# Patient Record
Sex: FEMALE | Race: ASIAN | NOT HISPANIC OR LATINO | Employment: UNEMPLOYED | URBAN - METROPOLITAN AREA
[De-identification: names, ages, dates, MRNs, and addresses within clinical notes are randomized per-mention and may not be internally consistent; named-entity substitution may affect disease eponyms.]

---

## 2018-01-12 NOTE — PROGRESS NOTES
Assessment    1  Cervical pain (723 1) (M54 2)   2  Cervical disc disorder (722 91) (M50 90)   3  Cervical myofascial pain syndrome (729 1) (M79 1)   4  Bilateral occipital neuralgia (723 8) (M54 81)    Plan  Bilateral occipital neuralgia, Cervical disc disorder, Cervical myofascial pain syndrome,  Cervical pain    · *1 - [facility] Physical Therapy  Consult  Status: Hold For - Scheduling   Requested for: [id number]   Ordered; For: Bilateral occipital neuralgia, Cervical disc disorder, Cervical myofascial pain syndrome, Cervical pain; Ordered By: Carrie Fernandez Performed:  Due: [id number]  Care Summary provided  : Yes   · Follow-up Visit in 4 Weeks Evaluation and Treatment  Follow-up  Status: Hold For -  Scheduling  Requested for: [id number]   Ordered; For: Bilateral occipital neuralgia, Cervical disc disorder, Cervical myofascial pain syndrome, Cervical pain; Ordered By: Carrie Fernandez Performed:  Due: [id number]    Discussion/Summary    My impressions and treatment recommendations were discussed in detail with the patient, who verbalized understanding and had no further questions  I discussed several interventional pain procedures for the patient, including cervical epidural steroid injections, trigger point injections, and bilateral occipital nerve blocks, however the patient wished to continue with physical therapy at this point  I've given the patient a new prescription for physical therapy 2-3 times per week for 4-6 weeks  I also suggested the patient trial a compounding cream to help relieve some of her myofascial pain  I've given her a prescription for this compounding cream to be used 2-4 times per day on her neck and upper back  I suggested that if she is not improved by her next office visit we should try more interventional pain procedures  Followup is planned with the patient in 4 weeks' time or sooner as warranted  Discharge instructions were provided   I personally saw and examined the patient and I agree with the above discussed plan of care  Chief Complaint    1  Neck Pain    History of Present Illness  Ms Del Angel is a pleasant 75-year-old female who presents to [facility] [address] [facility] for interval reevaluation of the above-stated pain complaints  The patient has a past medical and chronic pain history as outlined in the assessment section below  She was last seen in our office on December 15, 2015 at which time I suggested initiating physical therapy, trialed her on a H-wave unit, and prescribe her acupuncture  At today's office visit, the patient's pain score is 8/10 on the verbal numerical pain rating scale  She continues to complain of cervical pain that radiates into her head and up and over her head in what appears to be the bilateral occipital nerve distribution  She also complains of upper back pain that is nonradiating  She reports that her pain is worse in the morning and evening  She describes her pain as constant and reports that the quality of her pain as "sharp, pressure-like, numbness, vibration, and headache " The patient reports that she trialed the H-wave unit, but it gave her "shocks "and she returned it  She continues to go to physical therapy and reports that it is significantly improving her pain  Other than as stated above, the patient denies any interval changes in medications, medical condition, mental condition, symptoms, or allergies since the last office visit  WES DEL ANGEL presents with complaints of sudden onset of constant episodes of bilateral posterior neck pain, described as sharp, radiating to the bilateral trapezius  Review of Systems    Constitutional: no fever, no recent weight gain and no recent weight loss  Eyes: no double vision and no blurry vision  Cardiovascular: no chest pain, no palpitations and no lower extremity edema  Respiratory: no complaints of shortness of breath and no wheezing     Musculoskeletal: no difficulty walking, no muscle weakness, no joint stiffness, no joint swelling, no limb swelling`, no pain in extremity and no decreased range of motion  Neurological: no dizziness, no difficulty swallowing, no memory loss, no loss of consciousness and no seizures  Gastrointestinal: no nausea, no vomiting, no constipation and no diarrhea  Genitourinary: no difficulty initiating urine stream, no genital pain and no frequent urination  Integumentary: a rash  Psychiatric: no depression  Endocrine: no excessive thirst, no adrenal disease, no hypothyroidism and no hyperthyroidism  Hematologic/Lymphatic: no tendency for easy bruising and no tendency for easy bleeding  ROS reviewed  Active Problems    1  Cervical disc disorder (722 91) (M50 90)   2  Cervical myofascial pain syndrome (729 1) (M79 1)   3  Cervical pain (723 1) (M54 2)    Allergies    1  Tylenol    2  Latex    Vitals  Vital Signs [Data Includes: Current Encounter]    Recorded: 12Jan2016 02:09PM   Heart Rate 64   Respiration 18   Systolic 485   Diastolic 80   Height 4 ft 8 in   Weight 120 lb    BMI Calculated 26 9   BSA Calculated 1 43   Pain Scale 8     Physical Exam    Constitutional   General appearance: Abnormal   overweight  Eyes   Sclera: anicteric   HEENT   Hearing grossly intact  Neck   Neck: Supple, symmetric, trachea midline, no masses  Pulmonary   Respiratory effort: Even and unlabored  Cardiovascular   Examination of extremities: No edema or pitting edema present  Skin   Skin and subcutaneous tissue: Normal without rashes or lesions, well hydrated  Psychiatric   Mood and affect: Mood and affect appropriate  Neurologic   Cranial nerves: Cranial nerves II-XII grossly intact  Musculoskeletal   Gait and station: Normal   Gait is nonantalgic  Station is neutral    Cervical Spine examination demonstrates Cervical Spine:   Appearance: Normal normal lordosis     Tenderness: None except at the right paraspinal tenderness, left paraspinal tenderness, right trapezius muscle and left trapezius muscle  Palpatory findings include bilateral muscle spasms   Significant tenderness on several trigger points noted of the bilateral upper trapezius and cervical paraspinal musculature  Cervical Sensory Exam:  intact to light touch and pinprick in the upper extremities  ROM: Full    hand strength was normal bilaterally  wrist strength was normal bilaterally  elbow strength was normal bilaterally  shoulder strength was normal bilaterally  Results/Data  Results Free Text Form [facility]:   Results     CT Scan  Cervical 10/9/16  FINDINGS-    ALIGNMENT- Normal alignment of the cervical spine  No subluxation  VERTEBRAL BODIES- No fracture  DEGENERATIVE CHANGES- No significant cervical degenerative changes are  noted  Probable tiny central disc protrusions C3-4, C4-5, C5-6    PREVERTEBRAL AND PARASPINAL SOFT TISSUES- Normal     THORACIC INLET- Normal     IMPRESSION-  Probable tiny central disc protrusion C3-4, C4-5 and C5-6    No cervical spine fracture or traumatic malalignment                Future Appointments    Date/Time Provider Specialty Site   04/07/2016 03:00 PM Harold Meigs, MD Neurology [facility]     Signatures   Electronically signed by : ELIA Gardner ; Jan 12 2016  2:56PM EST                       (Author)

## 2020-11-14 ENCOUNTER — TRANSCRIBE ORDERS (OUTPATIENT)
Dept: ADMINISTRATIVE | Age: 50
End: 2020-11-14

## 2020-11-14 ENCOUNTER — LAB (OUTPATIENT)
Dept: LAB | Age: 50
End: 2020-11-14

## 2020-11-14 DIAGNOSIS — Z02.1 PHYSICAL EXAM, PRE-EMPLOYMENT: Primary | ICD-10-CM

## 2020-11-14 DIAGNOSIS — Z02.1 PHYSICAL EXAM, PRE-EMPLOYMENT: ICD-10-CM

## 2020-11-14 LAB — RUBV IGG SERPL IA-ACNC: 27 IU/ML

## 2020-11-14 PROCEDURE — 86480 TB TEST CELL IMMUN MEASURE: CPT

## 2020-11-14 PROCEDURE — 86765 RUBEOLA ANTIBODY: CPT

## 2020-11-14 PROCEDURE — 86787 VARICELLA-ZOSTER ANTIBODY: CPT

## 2020-11-14 PROCEDURE — 86762 RUBELLA ANTIBODY: CPT

## 2020-11-16 LAB
GAMMA INTERFERON BACKGROUND BLD IA-ACNC: 0.35 IU/ML
M TB IFN-G BLD-IMP: POSITIVE
M TB IFN-G CD4+ BCKGRND COR BLD-ACNC: 1.49 IU/ML
M TB IFN-G CD4+ BCKGRND COR BLD-ACNC: 2.13 IU/ML
MITOGEN IGNF BCKGRD COR BLD-ACNC: >10 IU/ML

## 2020-11-17 LAB
MEV IGG SER QL: NORMAL
VZV IGG SER IA-ACNC: NORMAL

## 2020-12-15 PROBLEM — L29.0 PRURITUS ANI: Status: ACTIVE | Noted: 2020-12-15

## 2020-12-21 ENCOUNTER — TRANSCRIBE ORDERS (OUTPATIENT)
Dept: ADMINISTRATIVE | Facility: HOSPITAL | Age: 50
End: 2020-12-21

## 2020-12-21 ENCOUNTER — LAB (OUTPATIENT)
Dept: LAB | Facility: HOSPITAL | Age: 50
End: 2020-12-21

## 2020-12-21 DIAGNOSIS — Z11.1 SCREENING EXAMINATION FOR PULMONARY TUBERCULOSIS: Primary | ICD-10-CM

## 2020-12-21 DIAGNOSIS — Z11.1 SCREENING EXAMINATION FOR PULMONARY TUBERCULOSIS: ICD-10-CM

## 2020-12-21 PROCEDURE — 36415 COLL VENOUS BLD VENIPUNCTURE: CPT

## 2020-12-21 PROCEDURE — 86480 TB TEST CELL IMMUN MEASURE: CPT

## 2020-12-23 LAB
GAMMA INTERFERON BACKGROUND BLD IA-ACNC: 0.13 IU/ML
M TB IFN-G BLD-IMP: POSITIVE
M TB IFN-G CD4+ BCKGRND COR BLD-ACNC: 1.14 IU/ML
M TB IFN-G CD4+ BCKGRND COR BLD-ACNC: 1.38 IU/ML
MITOGEN IGNF BCKGRD COR BLD-ACNC: >10 IU/ML

## 2020-12-30 ENCOUNTER — TRANSCRIBE ORDERS (OUTPATIENT)
Dept: ADMINISTRATIVE | Facility: HOSPITAL | Age: 50
End: 2020-12-30

## 2020-12-30 ENCOUNTER — HOSPITAL ENCOUNTER (OUTPATIENT)
Dept: RADIOLOGY | Facility: HOSPITAL | Age: 50
Discharge: HOME | End: 2020-12-30

## 2020-12-30 DIAGNOSIS — R76.8 FALSE POSITIVE ANA: Primary | ICD-10-CM

## 2020-12-30 DIAGNOSIS — R76.8 FALSE POSITIVE ANA: ICD-10-CM

## 2020-12-30 PROCEDURE — 71045 X-RAY EXAM CHEST 1 VIEW: CPT

## 2021-01-18 PROBLEM — K64.2 GRADE III HEMORRHOIDS: Status: ACTIVE | Noted: 2021-01-18

## 2021-01-18 PROBLEM — Z12.11 SCREENING FOR MALIGNANT NEOPLASM OF COLON: Status: ACTIVE | Noted: 2021-01-18

## 2021-03-04 ENCOUNTER — ANESTHESIA EVENT (OUTPATIENT)
Dept: PERIOP | Facility: HOSPITAL | Age: 51
End: 2021-03-04
Payer: COMMERCIAL

## 2021-03-04 NOTE — ANESTHESIA PREPROCEDURE EVALUATION
Procedure:  HEMORRHOIDALPEXY [facility]) (N/A Anus)    Relevant Problems   No relevant active problems        Physical Exam    Airway    Mallampati score: I  TM Distance: >3 FB  Neck ROM: full     Dental   No notable dental hx     Cardiovascular      Pulmonary      Other Findings        Anesthesia Plan  ASA Score- 1     Anesthesia Type- general with ASA Monitors  Additional Monitors:   Airway Plan: ETT  Plan Factors-Exercise tolerance (METS): >4 METS  Chart reviewed  Patient is not a current smoker  Patient not instructed to abstain from smoking on day of procedure  Patient did not smoke on day of surgery  Induction- intravenous  Postoperative Plan-     Informed Consent- Anesthetic plan and risks discussed with patient and spouse  I personally reviewed this patient with the CRNA  Discussed and agreed on the Anesthesia Plan with the CRNA  Laverne Soto

## 2021-03-04 NOTE — PRE-PROCEDURE INSTRUCTIONS
Pre-Surgery Instructions:   Medication Instructions    cetirizine (ZyrTEC) 5 MG tablet Instructed patient per Anesthesia Guidelines  Patient may take Zyrtec DOS  Hold all NSAIDs, aspirins, vitamins and supplements prior to surgery

## 2021-03-05 ENCOUNTER — ANESTHESIA (OUTPATIENT)
Dept: PERIOP | Facility: HOSPITAL | Age: 51
End: 2021-03-05
Payer: COMMERCIAL

## 2021-03-05 ENCOUNTER — HOSPITAL ENCOUNTER (OUTPATIENT)
Facility: HOSPITAL | Age: 51
Setting detail: OUTPATIENT SURGERY
Discharge: HOME | End: 2021-03-05
Attending: COLON & RECTAL SURGERY | Admitting: COLON & RECTAL SURGERY
Payer: COMMERCIAL

## 2021-03-05 VITALS
SYSTOLIC BLOOD PRESSURE: 112 MMHG | HEART RATE: 80 BPM | RESPIRATION RATE: 20 BRPM | TEMPERATURE: 97 F | DIASTOLIC BLOOD PRESSURE: 68 MMHG | OXYGEN SATURATION: 97 % | HEIGHT: 56 IN | WEIGHT: 121 LBS | BODY MASS INDEX: 27.22 KG/M2

## 2021-03-05 DIAGNOSIS — K64.2 GRADE III HEMORRHOIDS: ICD-10-CM

## 2021-03-05 LAB
EXT PREGNANCY TEST URINE: NEGATIVE
EXT. CONTROL: NORMAL

## 2021-03-05 PROCEDURE — 88304 TISSUE EXAM BY PATHOLOGIST: CPT | Performed by: PATHOLOGY

## 2021-03-05 PROCEDURE — C9290 INJ, BUPIVACAINE LIPOSOME: HCPCS | Performed by: COLON & RECTAL SURGERY

## 2021-03-05 PROCEDURE — 81025 URINE PREGNANCY TEST: CPT | Performed by: COLON & RECTAL SURGERY

## 2021-03-05 PROCEDURE — 46260 REMOVE IN/EX HEM GROUPS 2+: CPT | Performed by: COLON & RECTAL SURGERY

## 2021-03-05 RX ORDER — DEXAMETHASONE SODIUM PHOSPHATE 10 MG/ML
INJECTION, SOLUTION INTRAMUSCULAR; INTRAVENOUS AS NEEDED
Status: DISCONTINUED | OUTPATIENT
Start: 2021-03-05 | End: 2021-03-05

## 2021-03-05 RX ORDER — PROPOFOL 10 MG/ML
INJECTION, EMULSION INTRAVENOUS AS NEEDED
Status: DISCONTINUED | OUTPATIENT
Start: 2021-03-05 | End: 2021-03-05

## 2021-03-05 RX ORDER — ONDANSETRON 2 MG/ML
INJECTION INTRAMUSCULAR; INTRAVENOUS AS NEEDED
Status: DISCONTINUED | OUTPATIENT
Start: 2021-03-05 | End: 2021-03-05

## 2021-03-05 RX ORDER — OXYCODONE HYDROCHLORIDE AND ACETAMINOPHEN 5; 325 MG/1; MG/1
1 TABLET ORAL EVERY 6 HOURS PRN
Qty: 20 TABLET | Refills: 0 | Status: SHIPPED | OUTPATIENT
Start: 2021-03-05 | End: 2021-03-10

## 2021-03-05 RX ORDER — LIDOCAINE HYDROCHLORIDE 10 MG/ML
INJECTION, SOLUTION EPIDURAL; INFILTRATION; INTRACAUDAL; PERINEURAL AS NEEDED
Status: DISCONTINUED | OUTPATIENT
Start: 2021-03-05 | End: 2021-03-05

## 2021-03-05 RX ORDER — DIPHENHYDRAMINE HYDROCHLORIDE 50 MG/ML
12.5 INJECTION INTRAMUSCULAR; INTRAVENOUS ONCE AS NEEDED
Status: DISCONTINUED | OUTPATIENT
Start: 2021-03-05 | End: 2021-03-05 | Stop reason: HOSPADM

## 2021-03-05 RX ORDER — SODIUM CHLORIDE, SODIUM LACTATE, POTASSIUM CHLORIDE, CALCIUM CHLORIDE 600; 310; 30; 20 MG/100ML; MG/100ML; MG/100ML; MG/100ML
50 INJECTION, SOLUTION INTRAVENOUS CONTINUOUS
Status: DISCONTINUED | OUTPATIENT
Start: 2021-03-05 | End: 2021-03-05 | Stop reason: HOSPADM

## 2021-03-05 RX ORDER — MAGNESIUM HYDROXIDE 1200 MG/15ML
LIQUID ORAL AS NEEDED
Status: DISCONTINUED | OUTPATIENT
Start: 2021-03-05 | End: 2021-03-05 | Stop reason: HOSPADM

## 2021-03-05 RX ORDER — SODIUM CHLORIDE, SODIUM LACTATE, POTASSIUM CHLORIDE, CALCIUM CHLORIDE 600; 310; 30; 20 MG/100ML; MG/100ML; MG/100ML; MG/100ML
125 INJECTION, SOLUTION INTRAVENOUS CONTINUOUS
Status: DISCONTINUED | OUTPATIENT
Start: 2021-03-05 | End: 2021-03-05 | Stop reason: HOSPADM

## 2021-03-05 RX ORDER — MIDAZOLAM HYDROCHLORIDE 2 MG/2ML
INJECTION, SOLUTION INTRAMUSCULAR; INTRAVENOUS AS NEEDED
Status: DISCONTINUED | OUTPATIENT
Start: 2021-03-05 | End: 2021-03-05

## 2021-03-05 RX ORDER — PROPOFOL 10 MG/ML
INJECTION, EMULSION INTRAVENOUS CONTINUOUS PRN
Status: DISCONTINUED | OUTPATIENT
Start: 2021-03-05 | End: 2021-03-05

## 2021-03-05 RX ORDER — BUPIVACAINE HYDROCHLORIDE AND EPINEPHRINE 2.5; 5 MG/ML; UG/ML
INJECTION, SOLUTION EPIDURAL; INFILTRATION; INTRACAUDAL; PERINEURAL AS NEEDED
Status: DISCONTINUED | OUTPATIENT
Start: 2021-03-05 | End: 2021-03-05 | Stop reason: HOSPADM

## 2021-03-05 RX ORDER — FENTANYL CITRATE 50 UG/ML
INJECTION, SOLUTION INTRAMUSCULAR; INTRAVENOUS AS NEEDED
Status: DISCONTINUED | OUTPATIENT
Start: 2021-03-05 | End: 2021-03-05

## 2021-03-05 RX ORDER — OXYCODONE HYDROCHLORIDE AND ACETAMINOPHEN 5; 325 MG/1; MG/1
1 TABLET ORAL EVERY 4 HOURS PRN
Status: DISCONTINUED | OUTPATIENT
Start: 2021-03-05 | End: 2021-03-05 | Stop reason: HOSPADM

## 2021-03-05 RX ORDER — SODIUM CHLORIDE, SODIUM LACTATE, POTASSIUM CHLORIDE, CALCIUM CHLORIDE 600; 310; 30; 20 MG/100ML; MG/100ML; MG/100ML; MG/100ML
INJECTION, SOLUTION INTRAVENOUS CONTINUOUS PRN
Status: DISCONTINUED | OUTPATIENT
Start: 2021-03-05 | End: 2021-03-05

## 2021-03-05 RX ORDER — FENTANYL CITRATE/PF 50 MCG/ML
25 SYRINGE (ML) INJECTION
Status: DISCONTINUED | OUTPATIENT
Start: 2021-03-05 | End: 2021-03-05 | Stop reason: HOSPADM

## 2021-03-05 RX ORDER — ONDANSETRON 2 MG/ML
4 INJECTION INTRAMUSCULAR; INTRAVENOUS ONCE AS NEEDED
Status: DISCONTINUED | OUTPATIENT
Start: 2021-03-05 | End: 2021-03-05 | Stop reason: HOSPADM

## 2021-03-05 RX ORDER — HYDROMORPHONE HCL/PF 1 MG/ML
0.2 SYRINGE (ML) INJECTION
Status: DISCONTINUED | OUTPATIENT
Start: 2021-03-05 | End: 2021-03-05 | Stop reason: HOSPADM

## 2021-03-05 RX ORDER — LIDOCAINE HYDROCHLORIDE 10 MG/ML
0.5 INJECTION, SOLUTION EPIDURAL; INFILTRATION; INTRACAUDAL; PERINEURAL ONCE AS NEEDED
Status: DISCONTINUED | OUTPATIENT
Start: 2021-03-05 | End: 2021-03-05 | Stop reason: HOSPADM

## 2021-03-05 RX ADMIN — PROPOFOL 50 MG: 10 INJECTION, EMULSION INTRAVENOUS at 15:10

## 2021-03-05 RX ADMIN — PROPOFOL 20 MG: 10 INJECTION, EMULSION INTRAVENOUS at 14:35

## 2021-03-05 RX ADMIN — FENTANYL CITRATE 25 MCG: 50 INJECTION, SOLUTION INTRAMUSCULAR; INTRAVENOUS at 15:23

## 2021-03-05 RX ADMIN — MIDAZOLAM HYDROCHLORIDE 1 MG: 1 INJECTION, SOLUTION INTRAMUSCULAR; INTRAVENOUS at 14:35

## 2021-03-05 RX ADMIN — SODIUM CHLORIDE, SODIUM LACTATE, POTASSIUM CHLORIDE, AND CALCIUM CHLORIDE: .6; .31; .03; .02 INJECTION, SOLUTION INTRAVENOUS at 14:23

## 2021-03-05 RX ADMIN — PROPOFOL 50 MCG/KG/MIN: 10 INJECTION, EMULSION INTRAVENOUS at 14:29

## 2021-03-05 RX ADMIN — FENTANYL CITRATE 25 MCG: 50 INJECTION, SOLUTION INTRAMUSCULAR; INTRAVENOUS at 14:38

## 2021-03-05 RX ADMIN — PHENYLEPHRINE HYDROCHLORIDE 100 MCG: 10 INJECTION INTRAVENOUS at 14:48

## 2021-03-05 RX ADMIN — PROPOFOL 20 MG: 10 INJECTION, EMULSION INTRAVENOUS at 14:37

## 2021-03-05 RX ADMIN — DEXAMETHASONE SODIUM PHOSPHATE 4 MG: 10 INJECTION, SOLUTION INTRAMUSCULAR; INTRAVENOUS at 14:33

## 2021-03-05 RX ADMIN — LIDOCAINE HYDROCHLORIDE 50 MG: 10 INJECTION, SOLUTION EPIDURAL; INFILTRATION; INTRACAUDAL at 14:28

## 2021-03-05 RX ADMIN — SODIUM CHLORIDE, SODIUM LACTATE, POTASSIUM CHLORIDE, AND CALCIUM CHLORIDE: .6; .31; .03; .02 INJECTION, SOLUTION INTRAVENOUS at 15:11

## 2021-03-05 RX ADMIN — PROPOFOL 40 MG: 10 INJECTION, EMULSION INTRAVENOUS at 14:29

## 2021-03-05 RX ADMIN — FENTANYL CITRATE 25 MCG: 50 INJECTION, SOLUTION INTRAMUSCULAR; INTRAVENOUS at 14:48

## 2021-03-05 RX ADMIN — FENTANYL CITRATE 25 MCG: 50 INJECTION, SOLUTION INTRAMUSCULAR; INTRAVENOUS at 14:35

## 2021-03-05 RX ADMIN — MIDAZOLAM HYDROCHLORIDE 1 MG: 1 INJECTION, SOLUTION INTRAMUSCULAR; INTRAVENOUS at 14:23

## 2021-03-05 RX ADMIN — ONDANSETRON 4 MG: 2 INJECTION INTRAMUSCULAR; INTRAVENOUS at 14:32

## 2021-03-05 NOTE — OP NOTE
OPERATIVE REPORT  PATIENT NAME: Ethlyn Cranker    :  1970  MRN: [id number]  Pt Location: AN OR ROOM 01    SURGERY DATE: 3/5/2021    Surgeon(s) and Role:     * Gian Hernandez MD - Primary     * Che Luna MD - Assisting    Preop Diagnosis:  Grade III hemorrhoids [K64 2]    Post-Op Diagnosis Codes:  Internal and external hemorrhoids    Procedure(s) (LRB):  HEMORRHOIDECTOMY (N/A) , internal and external, 3 columns  Specimen(s):  ID Type Source Tests Collected by Time Destination   1 :  Tissue Hemorrhoids TISSUE EXAM Gian Hernandez MD 3/5/2021 1437        Estimated Blood Loss:   Minimal    Drains:  * No LDAs found *    Anesthesia Type:   IV Sedation with Anesthesia    Operative Indications:  Grade III hemorrhoids [K64 2]    Operative Findings:  Hemorrhoids, internal and external     Complications:   None    Procedure and Technique:  The patient was placed in a prone sg-knife position with the buttocks taped apart  The anal area was prepared with Betadine and draped in a sterile manner  A time-out was done  Inspection revealed large external hemorrhoids with prolapse  We had considered the transanal hemorrhoidal pexy and de arterialization but this did not appear to be appropriate, as the external hemorrhoids were extremely redundant and prolapsed substantially with Valsalva as the patient coughed during the procedure  The external hemorrhoids appeared to be best treated by excision  Hemorrhoidectomy was therefore prepared for the left lateral, right anterior and right posterior positions  Each site was treated similarly as follows  The hemorrhoid was retracted with the [facility] retractor  The hemorrhoid was grasped at the anal verge using a clamp  It was withdrawn caudally  Scissors technique was used to excise the hemorrhoid from the skin, 1 5 cm distal to the anal verge, with sharp excision proximally to the proximal extent of the internal hemorrhoids    The hemorrhoids were amputated at the junction with the distal rectum  The wounds were closed using running 2 0 chromic suture  Wounds were inspected for hemostasis were found to be dry  Before and after the hemorrhoidectomies, the areas were anesthetized with Exparel diluted 1 part to 2 using 40% Marcaine with epinephrine  Sponge needle instrument counts were correct      I was present for the entire procedure    Patient Disposition:  PACU     SIGNATURE: Tomeka Vásquez MD  DATE: March 5, 2021  TIME: 3:19 PM

## 2021-03-05 NOTE — ANESTHESIA POSTPROCEDURE EVALUATION
Post-Op Assessment Note    CV Status:  Stable  Pain Score: 0    Pain management: adequate     Mental Status:  Alert and awake   Hydration Status:  Euvolemic   PONV Controlled:  Controlled   Airway Patency:  Patent      Post Op Vitals Reviewed: Yes      Staff: CRNA         No complications documented      BP 96/52   Temp   97 0   Pulse  94   Resp   16   SpO2   97%

## 2021-03-05 NOTE — H&P
History and Physical   Colon and Rectal Surgery   Clint Smith 46 y o  female MRN: [id number]  Unit/Bed#: OR POOL Encounter: [id number]  21   2:06 PM      CC:  Hemorrhoids  History of Present Illness   HPI:  Clint Smith is a 46 y o  female for surgery  Plan:   Grade III hemorrhoids  She has a history of bulging hemorrhoidal tissue that can be reduced  She was seen recently and pruritus a night was noted with excoriation  She was treated expectantly  Since that time, her symptoms have recurred intermittently  She has irritation, discomfort, difficulty with hygiene, and annoyance that is not overcome with reducing the hemorrhoids manually      Examination shows large external hemorrhoids, circumferentially  Pruritus and excoriation seemed to have resolved but the hemorrhoids did not remain reduced during the examination  I think that hemorrhoidectomy or Transanal hemorrhoidopexy and dearteriolization are the only things that will reduce the tissues to allow for her to become comfortable over time  Medical management was reviewed but she prefers surgery  Hemorrhoidectomy is recommended unless Transanal hemorrhoidopexy and dearteriolization seems ideally suited to her anatomy  She and her  understand this      Risks, not limited to infection, bleeding, pain, persistent disease, need for future surgery, fecal incontinence, or nonhealing wounds were reviewed at length  Questions were answered       Screening for malignant neoplasm of colon  Colonoscopy risks, not limited to bleeding, perforated colon, need for surgery, and missed lesions were discussed  Alternatives were discussed  Questions were answered  She agreed to the procedure   She understands the risk that colon cancer will be missed without screening        Historical Information   Past Medical History:   Diagnosis Date    Acute inflammation of the pancreas      Past Surgical History:   Procedure Laterality Date     SECTION Meds/Allergies     Medications Prior to Admission   Medication    cetirizine (ZyrTEC) 5 MG tablet    menthol-zinc oxide (CALMOSPETINE) 0 44-20 625 %         Current Facility-Administered Medications:     lactated ringers infusion, 125 mL/hr, Intravenous, Continuous, Mariel Wheeler MD    lidocaine (PF) (XYLOCAINE-MPF) 1 % injection 0 5 mL, 0 5 mL, Infiltration, Once PRN, Mariel Wheeler MD    Allergies   Allergen Reactions    Chicken Allergy     Chocolate     Eggs Or Egg-Derived Products     Orange Concentrate [Flavoring Agent]     Sugar-Protein-Starch          Social History   Social History     Substance and Sexual Activity   Alcohol Use Never    Frequency: Never     Social History     Substance and Sexual Activity   Drug Use Never     Social History     Tobacco Use   Smoking Status Never Smoker   Smokeless Tobacco Never Used         Family History:   Family History   Problem Relation Age of Onset    Colon cancer Neg Hx          Objective     Current Vitals:   Blood Pressure: 120/58 (03/05/21 1237)  Pulse: 84 (03/05/21 1237)  Temperature: 98 3 °F (36 8 °C) (03/05/21 1237)  Temp Source: Temporal (03/05/21 1237)  Respirations: 18 (03/05/21 1237)  Height: 4' 8" (142 2 cm) (03/05/21 1237)  Weight - Scale: 54 9 kg (121 lb) (03/05/21 1237)  SpO2: 99 % (03/05/21 1237)  No intake or output data in the 24 hours ending 03/05/21 1406    Physical Exam:  General:  Well nourished, no distress  Neuro: Alert and oriented  Eyes:Sclera anicteric, conjunctiva pink  Pulm: Clear to auscultation bilaterally  No respiratory Distress  CV:  Regular rate and rhythm  No murmurs  Abdomen:  Soft, flat, non-tender, without masses or hepatosplenomegaly  Lab Results:       ASSESSMENT:  Kerry Pool is a 46 y o  female for Transanal hemorrhoidopexy and dearteriolization vs hemorrhoidectomy   We will determine the best option in the OR  PLAN:  Risks, not limited to infection, bleeding, pain, persistent disease, need for future surgery, fecal incontinence, or nonhealing wounds were reviewed at length  Questions were answered     Erica Calix MD

## 2021-03-05 NOTE — DISCHARGE INSTRUCTIONS
Hemorrhoidectomy   WHAT YOU SHOULD KNOW:   A hemorrhoidectomy is surgery to remove hemorrhoids  Hemorrhoids are swollen blood vessels inside your rectum or on your anus  AFTER YOU LEAVE:   Medicines:   · Topical medicine: This may come as pads, creams, ointments, or lotions  This medicine may help decrease pain and swelling  They may help your rectum heal faster  · Pain medicine: You may be given a prescription medicine to decrease pain  Do not wait until the pain is severe before you take this medicine  · Stool softeners: This medicine makes it easier for you to have a bowel movement  You may need this medicine to treat or prevent constipation  · Antibiotics: This medicine is given to help treat or prevent an infection caused by bacteria  · Take your medicine as directed  Call your healthcare provider if you think your medicine is not helping or if you have side effects  Tell him if you are allergic to any medicine  Keep a list of the medicines, vitamins, and herbs you take  Include the amounts, and when and why you take them  Bring the list or the pill bottles to follow-up visits  Carry your medicine list with you in case of an emergency  Follow up with your healthcare provider as directed:  Write down your questions so you remember to ask them during your visits  Self-care:   · Warm sitz bath:  Heat may help to decrease pain  Use a sitz bath  A sitz bath is a pan that fits on the toilet bowl and has warm water in it  Ask how often to use a sitz bath  · Prevent constipation:  Eat foods that are high in fiber, and drink more liquids  High-fiber foods include fruits, vegetables, whole grains, and bran  This will help soften your bowel movements  Regular exercise may also help prevent constipation  Contact your healthcare provider if:   · You have nausea or are vomiting  · You have a fever  · It is hard to urinate or have a bowel movement      · You have pain when you urinate or have a bowel movement  · You have questions or concerns about your condition or care  Seek care immediately or call 911 if:   · You bleed from your rectum, and you cannot get it to stop  · You have severe pain in your stomach or anus  · You cannot urinate, or you urinate very little  · Your arm or leg feels warm, tender, and painful  It may look swollen and red  · You suddenly feel lightheaded and short of breath  · You have chest pain when you take a deep breath or cough  You cough up blood  © 2014 [address] is for End User's use only and may not be sold, redistributed or otherwise used for commercial purposes  All illustrations and images included in CareNotes® are the copyrighted property of A D [employer] M , Inc  or [employer] Jar[employer]  The above information is an  only  It is not intended as medical advice for individual conditions or treatments  Talk to your doctor, nurse or pharmacist before following any medical regimen to see if it is safe and effective for you

## 2021-10-04 ENCOUNTER — TELEPHONE (OUTPATIENT)
Dept: OBGYN CLINIC | Facility: CLINIC | Age: 51
End: 2021-10-04

## (undated) DEVICE — PLUMEPEN PRO 10FT

## (undated) DEVICE — LIGHT HANDLE COVER SLEEVE DISP BLUE STELLAR

## (undated) DEVICE — BASIC SINGLE BASIN 2-LF: Brand: MEDLINE INDUSTRIES, INC.

## (undated) DEVICE — GAUZE SPONGES,16 PLY: Brand: CURITY

## (undated) DEVICE — BETHLEHEM UNIVERSAL MINOR GEN: Brand: CARDINAL HEALTH

## (undated) DEVICE — GLOVE SRG BIOGEL 7.5

## (undated) DEVICE — GLOVE INDICATOR PI UNDERGLOVE SZ 8 BLUE

## (undated) DEVICE — PENCIL ELECTROSURG E-Z CLEAN -0035H

## (undated) DEVICE — NEEDLE 25G X 1 1/2

## (undated) DEVICE — 3M™ DURAPORE™ SURGICAL TAPE 1538-3, 3 INCH X 10 YARD (7,5CM X 9,1M), 4 ROLLS/BOX: Brand: 3M™ DURAPORE™

## (undated) DEVICE — PAD GROUNDING ADULT

## (undated) DEVICE — SPONGE STICK WITH PVP-I: Brand: KENDALL

## (undated) DEVICE — VIAL DECANTER

## (undated) DEVICE — STERILE SURGICAL LUBRICANT,  TUBE: Brand: SURGILUBE